# Patient Record
Sex: MALE | Race: WHITE | NOT HISPANIC OR LATINO | Employment: FULL TIME | URBAN - METROPOLITAN AREA
[De-identification: names, ages, dates, MRNs, and addresses within clinical notes are randomized per-mention and may not be internally consistent; named-entity substitution may affect disease eponyms.]

---

## 2018-06-13 ENCOUNTER — HOSPITAL ENCOUNTER (OUTPATIENT)
Facility: CLINIC | Age: 73
End: 2018-06-13
Attending: UROLOGY | Admitting: UROLOGY
Payer: MEDICARE

## 2022-12-06 ENCOUNTER — TELEPHONE (OUTPATIENT)
Dept: OPHTHALMOLOGY | Facility: CLINIC | Age: 77
End: 2022-12-06

## 2022-12-06 NOTE — TELEPHONE ENCOUNTER
Attempted to reach patient' in regards to getting an appointment scheduled with any MD. A voicemail was left with the clinic number for them to call back.

## 2023-01-24 ENCOUNTER — LAB (OUTPATIENT)
Dept: LAB | Facility: CLINIC | Age: 78
End: 2023-01-24
Attending: OPHTHALMOLOGY
Payer: MEDICARE

## 2023-01-24 ENCOUNTER — OFFICE VISIT (OUTPATIENT)
Dept: OPHTHALMOLOGY | Facility: CLINIC | Age: 78
End: 2023-01-24
Attending: OPHTHALMOLOGY
Payer: MEDICARE

## 2023-01-24 DIAGNOSIS — H53.2 DIPLOPIA: ICD-10-CM

## 2023-01-24 DIAGNOSIS — H50.05 ALTERNATING ESOTROPIA: ICD-10-CM

## 2023-01-24 DIAGNOSIS — H53.2 DIPLOPIA: Primary | ICD-10-CM

## 2023-01-24 LAB
T4 FREE SERPL-MCNC: 1.03 NG/DL (ref 0.76–1.46)
TSH SERPL DL<=0.005 MIU/L-ACNC: 3.32 MU/L (ref 0.4–4)

## 2023-01-24 PROCEDURE — 92004 COMPRE OPH EXAM NEW PT 1/>: CPT | Performed by: OPHTHALMOLOGY

## 2023-01-24 PROCEDURE — G0463 HOSPITAL OUTPT CLINIC VISIT: HCPCS | Mod: 25

## 2023-01-24 PROCEDURE — 36415 COLL VENOUS BLD VENIPUNCTURE: CPT

## 2023-01-24 PROCEDURE — 84443 ASSAY THYROID STIM HORMONE: CPT

## 2023-01-24 PROCEDURE — 92060 SENSORIMOTOR EXAMINATION: CPT | Performed by: OPHTHALMOLOGY

## 2023-01-24 PROCEDURE — 83520 IMMUNOASSAY QUANT NOS NONAB: CPT

## 2023-01-24 PROCEDURE — 84445 ASSAY OF TSI GLOBULIN: CPT

## 2023-01-24 PROCEDURE — 84439 ASSAY OF FREE THYROXINE: CPT

## 2023-01-24 PROCEDURE — 92060 SENSORIMOTOR EXAMINATION: CPT | Mod: 26 | Performed by: OPHTHALMOLOGY

## 2023-01-24 ASSESSMENT — VISUAL ACUITY
OS_CC+: -3
OD_CC: 20/20
OD_CC+: -2
OS_CC: 20/25
CORRECTION_TYPE: GLASSES
METHOD: SNELLEN - LINEAR

## 2023-01-24 ASSESSMENT — REFRACTION_WEARINGRX
OD_SPHERE: +1.50
OD_AXIS: 160
OS_ADD: +2.50
SPECS_TYPE: PAL
OD_ADD: +2.50
OS_SPHERE: +1.50
OD_CYLINDER: +2.50
OS_CYLINDER: +2.75
OS_AXIS: 010

## 2023-01-24 ASSESSMENT — CONF VISUAL FIELD
METHOD: COUNTING FINGERS
OS_NORMAL: 1
OS_SUPERIOR_NASAL_RESTRICTION: 0
OD_SUPERIOR_TEMPORAL_RESTRICTION: 0
OS_INFERIOR_TEMPORAL_RESTRICTION: 0
OD_INFERIOR_TEMPORAL_RESTRICTION: 0
OS_INFERIOR_NASAL_RESTRICTION: 0
OD_INFERIOR_NASAL_RESTRICTION: 0
OD_NORMAL: 1
OD_SUPERIOR_NASAL_RESTRICTION: 0
OS_SUPERIOR_TEMPORAL_RESTRICTION: 0

## 2023-01-24 ASSESSMENT — TONOMETRY
IOP_METHOD: ICARE SOLID JC
OS_IOP_MMHG: 24
OD_IOP_MMHG: 21

## 2023-01-24 NOTE — NURSING NOTE
Chief Complaint(s) and History of Present Illness(es)     Diplopia Evaluation            Onset: new    Duration: 2 years    Frequency: constantly    Timing: when looking in the distance    Associated symptoms: Negative for droopy eyelid, eye pain and blurred vision    Treatments tried: glasses and closing one eye    Comments: Horizontal diplopia D, within 4-5ft is single.   1st experience 3/21 slight overlap 30-40sec and resolved. 5x over next 1.5years, same experience.  1st full double 8/22. Seen at [facility], carly 5BO tried for 1 month, worked well.   Got prism Rx gls 3BO BE, worked well for a couple weeks.  Diplopia stable since about 2 wks after noticing it in prism gls. None on computer or reading. Referred to [facility] by [facility]. No h/o MRI per pt.  Dx amaurosis fugax.          Comments    No Hx diplopia in past, no past eye surgery, no Hx strab.    Inf: pt

## 2023-01-25 LAB — TSH RECEP AB SER-ACNC: <1.1 IU/L (ref 0–1.75)

## 2023-01-25 ASSESSMENT — EXTERNAL EXAM - LEFT EYE: OS_EXAM: NORMAL

## 2023-01-25 ASSESSMENT — SLIT LAMP EXAM - LIDS
COMMENTS: MGD, TELANGIECTASIAS
COMMENTS: MGD, TELANGIECTASIAS

## 2023-01-25 ASSESSMENT — CUP TO DISC RATIO
OD_RATIO: 0.8
OS_RATIO: 0.8

## 2023-01-25 ASSESSMENT — EXTERNAL EXAM - RIGHT EYE: OD_EXAM: NORMAL

## 2023-01-26 LAB — TSI SER-ACNC: <1 TSI INDEX

## 2023-01-26 NOTE — PROGRESS NOTES
"Chief Complaints and History of Present Illnesses   Patient presents with     Diplopia Evaluation     Horizontal diplopia D, within 4-5ft is single.   1st experience 3/21 slight overlap 30-40sec and resolved. 5x over next 1.5years, same experience.  1st full double 8/22. Seen at [facility], fresnel 5BO tried for 1 month, worked well.   Got prism Rx gls 3BO BE, worked well for a couple weeks.  Diplopia stable since about 2 wks after noticing it in prism gls. None on computer or reading. Referred to [facility] by [facility]. No h/o MRI per pt.  Dx amaurosis fugax.   Review of systems for the eyes was negative other than the pertinent positives and negatives noted in the HPI.  History is obtained from the patient     Referring provider: Referred Self     [facility] care: [facility]   Assessment   Javid Meraz is a 77 year old male who presents with:       ICD-10-CM    1. 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 Meraz has esotropia and diplopia over last few years.  Will order MRI of brain and orbits. (will get done in [facility]--[place]oser to home)  Check TSH, T4, TRAB, and TSI.  Placed 12 ROSLYN Fresnel.  I discussed eye muscle surgery. Today with Javid, I reviewed the indications, risks, benefits, and alternatives of eye muscle surgery including, but not limited to, failure obtain the desired ocular alignment (\"over\" or \"under\" correction), diplopia, and damage to any structure in or around the eye that may necessitate treatment with medicine, laser, or surgery. I further explained that the goal of surgery is to help control Javid's strabismus. Surgery will not \"cure\" Javid's strabismus or " "resolve/prevent the need for refractive correction. Additional strabismus surgery may be required in the short or long term. I emphasized that regular follow-up to monitor and optimize his vision and alignment would be necessary. We also discussed the risks of surgical injury, bleeding, and infection which may necessitate further medical or surgical treatment and which may result in diplopia, loss of vision, blindness, or loss of the eye(s) in less than 1% of cases and the remote possibility of permanent damage to any organ system or death with the use of general anesthesia.  I explained that we would hide visible scars as much as possible in natural creases but that every patient heals and pigments differently resulting in a variable degree of scarring to the eyes or surrounding facial structures after surgery.  I provided multiple opportunities for questions, answered all questions to the best of my ability, and confirmed that my answers and my discussion were understood.  f/u 4-6 weeks.       Further details of the management plan can be found in the \"Patient Instructions\" section which was printed and given to the patient at checkout.  Return in about 2 months (around 3/24/2023) for an undilated exam with Dr. Fowler.   Attending Physician Attestation:  Complete documentation of historical and exam elements from today's encounter can be found in the full encounter summary report (not reduplicated in this progress note).  I personally obtained the chief complaint(s) and history of present illness.  I confirmed and edited as necessary the review of systems, past medical/surgical history, family history, social history, and examination findings as documented by others; and I examined the patient myself.  I personally reviewed the relevant tests, images, and reports as documented above.  I formulated and edited as necessary the assessment and plan and discussed the findings and management plan with the patient and " family. - Martha Fowler MD 1/25/2023 8:54 PM

## 2023-02-03 ENCOUNTER — HOSPITAL ENCOUNTER (OUTPATIENT)
Dept: MRI IMAGING | Facility: CLINIC | Age: 78
Discharge: HOME | End: 2023-02-03
Attending: OPHTHALMOLOGY | Admitting: OPHTHALMOLOGY
Payer: MEDICARE

## 2023-02-03 PROCEDURE — A9585 GADOBUTROL INJECTION: HCPCS | Performed by: OPHTHALMOLOGY

## 2023-02-03 PROCEDURE — 255N000002 HC RX 255 OP 636: Performed by: OPHTHALMOLOGY

## 2023-02-03 PROCEDURE — 70543 MRI ORBT/FAC/NCK W/O &W/DYE: CPT | Mod: MG

## 2023-02-03 RX ORDER — GADOBUTROL 604.72 MG/ML
7 INJECTION INTRAVENOUS ONCE
Status: COMPLETED | OUTPATIENT
Start: 2023-02-03 | End: 2023-02-03

## 2023-02-03 RX ADMIN — GADOBUTROL 7 ML: 604.72 INJECTION INTRAVENOUS at 10:48

## 2023-02-24 ENCOUNTER — TELEPHONE (OUTPATIENT)
Dept: OPHTHALMOLOGY | Facility: CLINIC | Age: 78
End: 2023-02-24
Payer: MEDICARE

## 2023-02-24 NOTE — TELEPHONE ENCOUNTER
I called patient. I had to leave a voicemail.  I communicated that his MRI Brain/orbits and thyroid labs were unremarkable. I advised him to keep his 03/2023 appt with Dr. Martha Fowler.     Lucius Argueta MD, MSc  Ophthalmology PGY-3 resident physician  Pager: [phone]

## 2023-03-07 ENCOUNTER — TELEPHONE (OUTPATIENT)
Dept: OPHTHALMOLOGY | Facility: CLINIC | Age: 78
End: 2023-03-07
Payer: MEDICARE

## 2023-03-07 ASSESSMENT — REFRACTION_FINALRX
OS_HPRISM: 9
OD_HBASE: OUT
OS_HBASE: OUT
OD_HPRISM: 9

## 2023-03-07 NOTE — TELEPHONE ENCOUNTER
Done.  CATHY Reddy 12:37 PM 3/7/2023        [facility] Call Center    Phone Message    May a detailed message be left on voicemail: yes     Reason for Call: Other: [facility] is calling to see if an updated prescription can be sent to them regarding the updated Fresnel press on prism RX that was prescribed during the appointment on 01/23/2023.     The patient would like to get a new pair of glasses and they would like a copy faxed to them at  [phone].      Please call David with any questions or concerns.      Action Taken: Other: peds eye    Travel Screening: Not Applicable

## 2023-04-11 ENCOUNTER — OFFICE VISIT (OUTPATIENT)
Dept: OPHTHALMOLOGY | Facility: CLINIC | Age: 78
End: 2023-04-11
Attending: OPHTHALMOLOGY
Payer: MEDICARE

## 2023-04-11 DIAGNOSIS — H50.05 ALTERNATING ESOTROPIA: Primary | ICD-10-CM

## 2023-04-11 PROCEDURE — 92012 INTRM OPH EXAM EST PATIENT: CPT | Performed by: OPHTHALMOLOGY

## 2023-04-11 PROCEDURE — G0463 HOSPITAL OUTPT CLINIC VISIT: HCPCS | Mod: 25 | Performed by: OPHTHALMOLOGY

## 2023-04-11 PROCEDURE — 92060 SENSORIMOTOR EXAMINATION: CPT | Performed by: OPHTHALMOLOGY

## 2023-04-11 PROCEDURE — 92060 SENSORIMOTOR EXAMINATION: CPT | Mod: 26 | Performed by: OPHTHALMOLOGY

## 2023-04-11 ASSESSMENT — REFRACTION_WEARINGRX
OS_ADD: +2.50
OS_SPHERE: +1.50
OD_AXIS: 160
OD_ADD: +2.50
OD_SPHERE: +1.50
OS_CYLINDER: +2.75
OS_AXIS: 010
OD_CYLINDER: +2.50
SPECS_TYPE: PAL

## 2023-04-11 ASSESSMENT — CONF VISUAL FIELD
OS_SUPERIOR_TEMPORAL_RESTRICTION: 0
METHOD: COUNTING FINGERS
OS_INFERIOR_TEMPORAL_RESTRICTION: 0
OS_INFERIOR_NASAL_RESTRICTION: 0
OD_INFERIOR_TEMPORAL_RESTRICTION: 0
OD_NORMAL: 1
OS_NORMAL: 1
OS_SUPERIOR_NASAL_RESTRICTION: 0
OD_SUPERIOR_NASAL_RESTRICTION: 0
OD_INFERIOR_NASAL_RESTRICTION: 0
OD_SUPERIOR_TEMPORAL_RESTRICTION: 0

## 2023-04-11 ASSESSMENT — VISUAL ACUITY
OD_CC: 20/25
OS_CC: 20/60
OD_CC+: +2
CORRECTION_TYPE: GLASSES

## 2023-04-11 NOTE — NURSING NOTE
Chief Complaint(s) and History of Present Illness(es)     Diplopia Follow-Up            Laterality: both eyes    Comments: Doing well with Fresnel prism in addition to ground in prism in glasses. Pt reports images are single, has not noticed any image separation updated prism. VA stable. No other concerns. Will be ordering prism glasses from [facility] on Monday.  Thyroid labs normal after LV. Nl brain MRI.  Inf: pt          Comments    Thyroid labs normal.  Feb 2023  IMPRESSION:  1. Normal brain MRI.  2. Normal MRI of the orbits.

## 2023-04-25 ASSESSMENT — EXTERNAL EXAM - LEFT EYE: OS_EXAM: NORMAL

## 2023-04-25 ASSESSMENT — SLIT LAMP EXAM - LIDS
COMMENTS: MGD, TELANGIECTASIAS
COMMENTS: MGD, TELANGIECTASIAS

## 2023-04-25 ASSESSMENT — EXTERNAL EXAM - RIGHT EYE: OD_EXAM: NORMAL

## 2023-04-25 NOTE — PROGRESS NOTES
"Chief Complaints and History of Present Illnesses   Patient presents with     Diplopia Follow-Up     Doing well with Fresnel prism in addition to ground in prism in glasses. Pt reports images are single, has not noticed any image separation updated prism. VA stable. No other concerns. Will be ordering prism glasses from [facility] on Monday.  Thyroid labs normal after LV. Nl brain MRI.  Inf: pt   Review of systems for the eyes was negative other than the pertinent positives and negatives noted in the HPI.  History is obtained from the patient     Referring provider: Referred Self     Primary care: [facility]   Assessment   Javid Meraz is a 77 year old male who presents with:       ICD-10-CM    1. 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 Meraz wants to order prism glasses for big trip in July to [place] and [place] with his granddaughter.  He will f/u after this trip if glasses are not working well and wishes to discuss surgery.       Further details of the management plan can be found in the \"Patient Instructions\" section which was printed and given to the patient at checkout.  No follow-ups on file.   Attending Physician Attestation:  Complete documentation of historical and exam elements from today's encounter can be found in the full encounter summary report (not reduplicated in this progress note).  I personally obtained the chief complaint(s) and history of present illness.  I confirmed and edited as necessary the review of systems, past medical/surgical history, family history, social history, and examination findings as documented by others; and I examined the patient myself.  I personally reviewed the relevant tests, images, and reports as documented above.  I formulated and edited as necessary the assessment and plan and discussed the findings and management plan with the patient and family. - Martha Fowler MD 4/25/2023 2:02 PM        "

## 2025-02-17 ENCOUNTER — APPOINTMENT (OUTPATIENT)
Dept: GENERAL RADIOLOGY | Facility: CLINIC | Age: 80
End: 2025-02-17
Attending: EMERGENCY MEDICINE
Payer: MEDICARE

## 2025-02-17 ENCOUNTER — HOSPITAL ENCOUNTER (EMERGENCY)
Facility: CLINIC | Age: 80
Discharge: HOME | End: 2025-02-17
Attending: EMERGENCY MEDICINE | Admitting: EMERGENCY MEDICINE
Payer: MEDICARE

## 2025-02-17 VITALS
DIASTOLIC BLOOD PRESSURE: 97 MMHG | HEART RATE: 105 BPM | BODY MASS INDEX: 23.7 KG/M2 | RESPIRATION RATE: 16 BRPM | OXYGEN SATURATION: 95 % | HEIGHT: 69 IN | TEMPERATURE: 97.7 F | WEIGHT: 160 LBS | SYSTOLIC BLOOD PRESSURE: 141 MMHG

## 2025-02-17 DIAGNOSIS — K59.00 CONSTIPATION, UNSPECIFIED CONSTIPATION TYPE: ICD-10-CM

## 2025-02-17 LAB
ALBUMIN SERPL BCG-MCNC: 4.2 G/DL (ref 3.5–5.2)
ALP SERPL-CCNC: 137 U/L (ref 40–150)
ALT SERPL W P-5'-P-CCNC: 46 U/L (ref 0–70)
ANION GAP SERPL CALCULATED.3IONS-SCNC: 14 MMOL/L (ref 7–15)
AST SERPL W P-5'-P-CCNC: 35 U/L (ref 0–45)
BASOPHILS # BLD AUTO: 0.1 10E3/UL (ref 0–0.2)
BASOPHILS NFR BLD AUTO: 1 %
BILIRUB SERPL-MCNC: 2 MG/DL
BUN SERPL-MCNC: 18.1 MG/DL (ref 8–23)
CALCIUM SERPL-MCNC: 9.6 MG/DL (ref 8.8–10.4)
CHLORIDE SERPL-SCNC: 99 MMOL/L (ref 98–107)
CREAT SERPL-MCNC: 0.88 MG/DL (ref 0.67–1.17)
EGFRCR SERPLBLD CKD-EPI 2021: 86 ML/MIN/1.73M2
EOSINOPHIL # BLD AUTO: 0.2 10E3/UL (ref 0–0.7)
EOSINOPHIL NFR BLD AUTO: 2 %
ERYTHROCYTE [DISTWIDTH] IN BLOOD BY AUTOMATED COUNT: 12.5 % (ref 10–15)
GLUCOSE SERPL-MCNC: 146 MG/DL (ref 70–99)
HCO3 SERPL-SCNC: 24 MMOL/L (ref 22–29)
HCT VFR BLD AUTO: 46.7 % (ref 40–53)
HGB BLD-MCNC: 16 G/DL (ref 13.3–17.7)
HOLD SPECIMEN: NORMAL
IMM GRANULOCYTES # BLD: 0 10E3/UL
IMM GRANULOCYTES NFR BLD: 0 %
LIPASE SERPL-CCNC: 46 U/L (ref 13–60)
LYMPHOCYTES # BLD AUTO: 2.2 10E3/UL (ref 0.8–5.3)
LYMPHOCYTES NFR BLD AUTO: 18 %
MCH RBC QN AUTO: 30.5 PG (ref 26.5–33)
MCHC RBC AUTO-ENTMCNC: 34.3 G/DL (ref 31.5–36.5)
MCV RBC AUTO: 89 FL (ref 78–100)
MONOCYTES # BLD AUTO: 0.9 10E3/UL (ref 0–1.3)
MONOCYTES NFR BLD AUTO: 7 %
NEUTROPHILS # BLD AUTO: 9 10E3/UL (ref 1.6–8.3)
NEUTROPHILS NFR BLD AUTO: 73 %
NRBC # BLD AUTO: 0 10E3/UL
NRBC BLD AUTO-RTO: 0 /100
PLATELET # BLD AUTO: 228 10E3/UL (ref 150–450)
POTASSIUM SERPL-SCNC: 3.8 MMOL/L (ref 3.4–5.3)
PROT SERPL-MCNC: 7.1 G/DL (ref 6.4–8.3)
RBC # BLD AUTO: 5.25 10E6/UL (ref 4.4–5.9)
SODIUM SERPL-SCNC: 137 MMOL/L (ref 135–145)
WBC # BLD AUTO: 12.4 10E3/UL (ref 4–11)

## 2025-02-17 PROCEDURE — 99284 EMERGENCY DEPT VISIT MOD MDM: CPT | Performed by: EMERGENCY MEDICINE

## 2025-02-17 PROCEDURE — 85025 COMPLETE CBC W/AUTO DIFF WBC: CPT | Performed by: SOCIAL WORKER

## 2025-02-17 PROCEDURE — 74019 RADEX ABDOMEN 2 VIEWS: CPT

## 2025-02-17 PROCEDURE — 82565 ASSAY OF CREATININE: CPT | Performed by: SOCIAL WORKER

## 2025-02-17 PROCEDURE — 250N000013 HC RX MED GY IP 250 OP 250 PS 637: Performed by: EMERGENCY MEDICINE

## 2025-02-17 PROCEDURE — 83690 ASSAY OF LIPASE: CPT | Performed by: SOCIAL WORKER

## 2025-02-17 PROCEDURE — 36415 COLL VENOUS BLD VENIPUNCTURE: CPT | Performed by: EMERGENCY MEDICINE

## 2025-02-17 RX ADMIN — SIMETHICONE 226 ML: 125 CAPSULE, LIQUID FILLED ORAL at 08:52

## 2025-02-17 ASSESSMENT — ACTIVITIES OF DAILY LIVING (ADL)
ADLS_ACUITY_SCORE: 41

## 2025-02-17 ASSESSMENT — COLUMBIA-SUICIDE SEVERITY RATING SCALE - C-SSRS
2. HAVE YOU ACTUALLY HAD ANY THOUGHTS OF KILLING YOURSELF IN THE PAST MONTH?: NO
1. IN THE PAST MONTH, HAVE YOU WISHED YOU WERE DEAD OR WISHED YOU COULD GO TO SLEEP AND NOT WAKE UP?: NO
6. HAVE YOU EVER DONE ANYTHING, STARTED TO DO ANYTHING, OR PREPARED TO DO ANYTHING TO END YOUR LIFE?: NO

## 2025-02-17 NOTE — ED PROVIDER NOTES
"  Emergency Department Note      History of Present Illness     Chief Complaint   Constipation    HPI   Javid Meraz is a 79 year old male with a history of hypertension, hyperlipidemia, CAD, prostate cancer, and recurrent UTI who presents with concern for constipation. He reports he has not had a bowel movement in 4-5 days he notes. He adds he rarely ever has this problem. He adds the last time he was seen in the ED for constipation was 15 years ago. He tried taking Miralax as well as two other over the counter medications for constipation with no improvement. He does not take this daily. He denies blood in stool, fevers, or vomiting.     Independent Historian   None    Review of External Notes   ***    Past Medical History     Medical History and Problem List   Allergic rhinitis   Chronic diarrhea   CAD in native artery   Former smoker   Gilbert syndrome   Hypercholesterolemia  Hypertension  Hyperlipidemia   Hemorrhoids      Prediabetes   History of prostate cancer   History of colonic polyps   Mitral valve disorders   Mitral valve prolapse   Recurrent UTI  Vitamin D deficiency     Medications   Atorvastatin  Diltiazem CD    Surgical History   Rad resection tonsil/pillars  Removal colon polyps     Physical Exam     Patient Vitals for the past 24 hrs:   BP Temp Temp src Pulse Resp SpO2 Height Weight   02/17/25 0754 -- -- -- -- -- 93 % -- --   02/17/25 0738 (!) 136/97 -- -- 89 16 97 % -- --   02/17/25 0301 (!) 159/80 97.7  F (36.5  C) Oral 89 16 95 % 1.753 m (5' 9\") 72.6 kg (160 lb)     Physical Exam  ***    Diagnostics     Lab Results   Labs Ordered and Resulted from Time of ED Arrival to Time of ED Departure   COMPREHENSIVE METABOLIC PANEL - Abnormal       Result Value    Sodium 137      Potassium 3.8      Carbon Dioxide (CO2) 24      Anion Gap 14      Urea Nitrogen 18.1      Creatinine 0.88      GFR Estimate 86      Calcium 9.6      Chloride 99      Glucose 146 (*)     Alkaline Phosphatase 137      AST 35      " "ALT 46      Protein Total 7.1      Albumin 4.2      Bilirubin Total 2.0 (*)    CBC WITH PLATELETS AND DIFFERENTIAL - Abnormal    WBC Count 12.4 (*)     RBC Count 5.25      Hemoglobin 16.0      Hematocrit 46.7      MCV 89      MCH 30.5      MCHC 34.3      RDW 12.5      Platelet Count 228      % Neutrophils 73      % Lymphocytes 18      % Monocytes 7      % Eosinophils 2      % Basophils 1      % Immature Granulocytes 0      NRBCs per 100 WBC 0      Absolute Neutrophils 9.0 (*)     Absolute Lymphocytes 2.2      Absolute Monocytes 0.9      Absolute Eosinophils 0.2      Absolute Basophils 0.1      Absolute Immature Granulocytes 0.0      Absolute NRBCs 0.0     LIPASE - Normal    Lipase 46       Imaging   XR Abdomen 2 Views   Final Result   IMPRESSION: Supine and upright KUB. Marked residual stool and moderate fecal impaction. Bowel gas pattern nonspecific. Nothing for obstruction or free air. No evidence for renal stones.        Independent Interpretation   I independently reviewed the patient's abdominal XR. I see no*** evidence of ***.     ED Course      Medications Administered   Medications   Enema Compound (docusate/mineral oil/NaPhos) NO MAG CIT PREMIX (226 mLs Rectal $Given 2/17/25 0852)     Discussion of Management   None    ED Course   ED Course as of 02/17/25 1024   Mon Feb 17, 2025   0731 I obtained the patient's history and examined as noted above.    1015 Patient's nurse informed me that the patient was able to have a bowel movement.    1022 I rechecked the patient and explained findings. He reports that was the \"biggest bowel movement of his life.\"     Additional Documentation  None    Medical Decision Making / Diagnosis     CMS Diagnoses: None    MIPS       None    [facility]   Javid Meraz is a 79 year old male ***    He is in stable condition at the time of discharge, red flags that should merit ED return were discussed as well as recommended follow-up instructions. All questions were answered and he is in " agreement with the plan.     Disposition   The patient was discharged.     Diagnosis     ICD-10-CM    1. Constipation, unspecified constipation type  K59.00          Scribe Disclosure:  I, Enma Somers, am serving as a scribe at 7:19 AM on 2/17/2025 to document services personally performed by Cyril Valle MD based on my observations and the provider's statements to me.

## 2025-02-17 NOTE — ED NOTES
Patient on the call light noting that he was able to go, RN will be notified when out of another patients room.

## 2025-02-17 NOTE — ED TRIAGE NOTES
Pt reports constipation with rectal pain. No BM for about 5 days, has tried OTC med without relief.      Triage Assessment (Adult)       Row Name 02/17/25 0300          Triage Assessment    Airway WDL WDL        Respiratory WDL    Respiratory WDL WDL        Peripheral/Neurovascular WDL    Peripheral Neurovascular WDL WDL        Cognitive/Neuro/Behavioral WDL    Cognitive/Neuro/Behavioral WDL WDL

## 2025-06-24 ENCOUNTER — OFFICE VISIT (OUTPATIENT)
Dept: OPHTHALMOLOGY | Facility: CLINIC | Age: 80
End: 2025-06-24
Attending: OPHTHALMOLOGY
Payer: MEDICARE

## 2025-06-24 DIAGNOSIS — H53.2 DIPLOPIA: ICD-10-CM

## 2025-06-24 DIAGNOSIS — H50.00 MONOCULAR ESOTROPIA: Primary | ICD-10-CM

## 2025-06-24 PROCEDURE — 99214 OFFICE O/P EST MOD 30 MIN: CPT | Performed by: OPHTHALMOLOGY

## 2025-06-24 PROCEDURE — G0463 HOSPITAL OUTPT CLINIC VISIT: HCPCS | Performed by: OPHTHALMOLOGY

## 2025-06-24 PROCEDURE — 92060 SENSORIMOTOR EXAMINATION: CPT | Performed by: OPHTHALMOLOGY

## 2025-06-24 ASSESSMENT — CONF VISUAL FIELD
OS_SUPERIOR_NASAL_RESTRICTION: 0
OD_INFERIOR_TEMPORAL_RESTRICTION: 0
OD_SUPERIOR_NASAL_RESTRICTION: 0
OS_INFERIOR_TEMPORAL_RESTRICTION: 0
OS_NORMAL: 1
OD_INFERIOR_NASAL_RESTRICTION: 0
OS_SUPERIOR_TEMPORAL_RESTRICTION: 0
OD_NORMAL: 1
OD_SUPERIOR_TEMPORAL_RESTRICTION: 0
OS_INFERIOR_NASAL_RESTRICTION: 0

## 2025-06-24 ASSESSMENT — REFRACTION_WEARINGRX
OS_CYLINDER: +2.50
OD_AXIS: 165
OD_ADD: +2.75
OD_CYLINDER: +2.75
OD_SPHERE: +1.25
OS_ADD: +2.75
OS_SPHERE: +1.00
OS_AXIS: 010

## 2025-06-24 ASSESSMENT — TONOMETRY
IOP_METHOD: ICARE
OS_IOP_MMHG: 21
OD_IOP_MMHG: 22

## 2025-06-24 ASSESSMENT — VISUAL ACUITY
OS_PH_CC+: -2
CORRECTION_TYPE: GLASSES
OD_CC+: -2
METHOD: SNELLEN - LINEAR
OD_CC: 20/25
OS_PH_CC: 20/25
OS_CC+: +2
OS_CC: 20/40

## 2025-07-02 ASSESSMENT — SLIT LAMP EXAM - LIDS
COMMENTS: MGD, TELANGIECTASIAS
COMMENTS: MGD, TELANGIECTASIAS

## 2025-07-02 ASSESSMENT — EXTERNAL EXAM - RIGHT EYE: OD_EXAM: NORMAL

## 2025-07-02 ASSESSMENT — EXTERNAL EXAM - LEFT EYE: OS_EXAM: NORMAL

## 2025-07-02 NOTE — PROGRESS NOTES
"Chief Complaints and History of Present Illnesses   Patient presents with    Diplopia Follow Up     Patient reports constant horizontal diplopia. Worse at distance. Diplopia has been worsening since LV, but stable for about a year. Minimal diplopia at near. Patient tends to close LE eye, especially when driving. Interested in surgery.  Newer fresnel now is not longer helping diplopia.    Review of systems for the eyes was negative other than the pertinent positives and negatives noted in the HPI.  History is obtained from the patient     Referring provider: Referred Self     Primary care: [facility]   Assessment   Javid Meraz is a 80 year old male who presents with:       ICD-10-CM    1. 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 Diplopia  H53.2             Plan  Mr. Meraz has large esotropia and diplopia.  I recommend eye muscle surgery. Today with Javid, I reviewed the indications, risks, benefits, and alternatives of eye muscle surgery including, but not limited to, failure obtain the desired ocular alignment (\"over\" or \"under\" correction), diplopia, and damage to any structure in or around the eye that may necessitate treatment with medicine, laser, or surgery. I further explained that the goal of surgery is to help control Javid's strabismus. Surgery will not \"cure\" Javid's strabismus or resolve/prevent the need for refractive correction. Additional strabismus surgery may be required in the short or long term. I emphasized that regular follow-up to monitor and optimize his vision and alignment would be necessary. We also discussed the risks of surgical injury, bleeding, and infection which may necessitate further medical or surgical treatment and which may result in diplopia, loss of vision, blindness, or loss of the eye(s) in less than 1% of cases and the remote possibility of permanent damage to any organ system or death with the use of general anesthesia.  I explained that we would hide " "visible scars as much as possible in natural creases but that every patient heals and pigments differently resulting in a variable degree of scarring to the eyes or surrounding facial structures after surgery.  I provided multiple opportunities for questions, answered all questions to the best of my ability, and confirmed that my answers and my discussion were understood.        Further details of the management plan can be found in the \"Patient Instructions\" section which was printed and given to the patient at checkout.  No follow-ups on file.   Attending Physician Attestation:  Complete documentation of historical and exam elements from today's encounter can be found in the full encounter summary report (not reduplicated in this progress note).  I personally obtained the chief complaint(s) and history of present illness.  I confirmed and edited as necessary the review of systems, past medical/surgical history, family history, social history, and examination findings as documented by others; and I examined the patient myself.  I personally reviewed the relevant tests, images, and reports as documented above.  I formulated and edited as necessary the assessment and plan and discussed the findings and management plan with the patient and family. - Martha Fowler MD 7/2/2025 1:17 PM          "

## 2025-07-22 ENCOUNTER — OFFICE VISIT (OUTPATIENT)
Dept: OPHTHALMOLOGY | Facility: CLINIC | Age: 80
End: 2025-07-22
Attending: OPHTHALMOLOGY
Payer: MEDICARE

## 2025-07-22 DIAGNOSIS — H53.2 DIPLOPIA: ICD-10-CM

## 2025-07-22 DIAGNOSIS — H50.00 MONOCULAR ESOTROPIA: Primary | ICD-10-CM

## 2025-07-22 PROCEDURE — 92060 SENSORIMOTOR EXAMINATION: CPT

## 2025-07-22 PROCEDURE — 92285 EXTERNAL OCULAR PHOTOGRAPHY: CPT

## 2025-07-22 ASSESSMENT — REFRACTION_WEARINGRX
OS_ADD: +2.75
OS_SPHERE: +1.00
OD_SPHERE: +1.25
OD_CYLINDER: +2.75
OS_AXIS: 010
OS_CYLINDER: +2.50
OD_ADD: +2.75
OD_AXIS: 165

## 2025-07-22 ASSESSMENT — VISUAL ACUITY
OS_CC: 20/40
OD_CC: 20/25
OS_CC+: -2
OD_CC+: -3
METHOD: SNELLEN - LINEAR

## 2025-07-22 NOTE — NURSING NOTE
Chief Complaint(s) and History of Present Illness(es)       Diplopia Follow Up              Laterality: left eye    Associated symptoms: Negative for dizziness, muscle weakness and numbness    Comments: No changes to alignment noticed, constant diplopia, closes an eye to help with driving

## 2025-07-22 NOTE — PROGRESS NOTES
Chief Complaint(s) & History of Present Illness  Chief Complaint(s) and History of Present Illness(es)       Diplopia Follow Up              Laterality: left eye    Associated symptoms: Negative for dizziness, muscle weakness and numbness    Comments: No changes to alignment noticed, constant diplopia, closes an eye to help with driving                         Assessment and Plan:      Javid Meraz is a 80 year old male who presents with:     Monocular esotropia  stable  - Sensorimotor  - External Photos 9 [employer] Gazes    Diplopia  - Sensorimotor       PLAN:  Continue to surgery as planned

## 2025-07-26 ENCOUNTER — HEALTH MAINTENANCE LETTER (OUTPATIENT)
Age: 80
End: 2025-07-26

## 2025-07-29 ENCOUNTER — ANESTHESIA EVENT (OUTPATIENT)
Dept: SURGERY | Facility: CLINIC | Age: 80
End: 2025-07-29
Payer: COMMERCIAL

## 2025-07-29 ASSESSMENT — LIFESTYLE VARIABLES: TOBACCO_USE: 1

## 2025-07-29 NOTE — ANESTHESIA PREPROCEDURE EVALUATION
"Anesthesia Pre-Procedure Evaluation    Patient: Javid Meraz   MRN: [id number] : 1945          Procedure : Procedure(s):  RECESSION OR RESECTION, MUSCLE, EXTRAOCULAR, FOR STRABISMUS CORRECTION         Past Medical History:   Diagnosis Date    Hypercholesteraemia     Hypertension       Past Surgical History:   Procedure Laterality Date    AS RAD RESEC TONSIL/PILLARS        Allergies   Allergen Reactions    Fish Oil Hives and Nausea     Halibut, Sole, and Swordfish  (other fish ok)      Social History     Tobacco Use    Smoking status: Former     Current packs/day: 0.00     Types: Cigarettes     Quit date:      Years since quittin.6    Smokeless tobacco: Not on file   Substance Use Topics    Alcohol use: Yes     Comment: social      Wt Readings from Last 1 Encounters:   25 72.6 kg (160 lb)        Anesthesia Evaluation            ROS/MED HX  ENT/Pulmonary:     (+)           allergic rhinitis,     tobacco use, Past use,                       Neurologic: Comment: horizontal diplopia       Cardiovascular: Comment: Per H&P Note, \"He experienced an increase in heart palpitations during the spring, but these have since subsided. He continues to experience symptoms, but they are not as severe as they were in the spring. He has been dealing with these symptoms for 50 years. He is able to walk at least four city blocks and climb two flights of stairs. He is currently taking diltiazem for his heart and blood pressure.\" And \"Holter monitor showing occasional supraventricular beats\"          (+) Dyslipidemia hypertension- -  CAD -  - -                          Irregular Heartbeat/Palpitations,            METS/Exercise Tolerance:     Hematologic:       Musculoskeletal:       GI/Hepatic:       Renal/Genitourinary:       Endo:     (+)  type II DM,   Not using insulin,                 Psychiatric/Substance Use:       Infectious Disease:       Malignancy:   (+) Malignancy, History of Prostate.    Other:      " "        Physical Exam  Airway  Cardiovascular  Comments: Per H&P Note, \"He experienced an increase in heart palpitations during the spring, but these have since subsided. He continues to experience symptoms, but they are not as severe as they were in the spring. He has been dealing with these symptoms for 50 years. He is able to walk at least four city blocks and climb two flights of stairs. He is currently taking diltiazem for his heart and blood pressure.\" And \"Holter monitor showing occasional supraventricular beats\"        Dental     Pulmonary       Neurological   Other Findings       OUTSIDE LABS:  CBC:   Lab Results   Component Value Date    WBC 12.4 (H) 02/17/2025    WBC 13.1 (H) 10/01/2008    HGB 16.0 02/17/2025    HGB 16.4 10/01/2008    HCT 46.7 02/17/2025    HCT 49.0 10/01/2008     02/17/2025     10/01/2008     BMP:   Lab Results   Component Value Date     02/17/2025     10/01/2008    POTASSIUM 3.8 02/17/2025    POTASSIUM 4.4 10/01/2008    CHLORIDE 99 02/17/2025    CHLORIDE 99 10/01/2008    CO2 24 02/17/2025    CO2 29 10/01/2008    BUN 18.1 02/17/2025    BUN 17 10/01/2008    CR 0.88 02/17/2025    CR 0.93 10/01/2008     (H) 02/17/2025     (H) 10/01/2008     COAGS: No results found for: \"PTT\", \"INR\", \"FIBR\"  POC: No results found for: \"BGM\", \"HCG\", \"HCGS\"  HEPATIC:   Lab Results   Component Value Date    ALBUMIN 4.2 02/17/2025    PROTTOTAL 7.1 02/17/2025    ALT 46 02/17/2025    AST 35 02/17/2025    ALKPHOS 137 02/17/2025    BILITOTAL 2.0 (H) 02/17/2025     OTHER:   Lab Results   Component Value Date    CAM 9.6 02/17/2025    LIPASE 46 02/17/2025    TSH 3.32 01/24/2023    T4 1.03 01/24/2023       Anesthesia Plan    ASA Status:  2       Anesthesia Type: General.  Airway: supraglottic airway.  Induction: intravenous.  Maintenance: Balanced.   Techniques and Equipment:     - Airway:  Planned airway equipment includes supraglottic airway.     - Monitoring Plan: standard ASA " monitoring, processed EEG monitor     Consents            Postoperative Care         Comments:                   Carmen Maddox,     I have reviewed the pertinent notes and labs in the chart from the past 30 days and (re)examined the patient.  Any updates or changes from those notes are reflected in this note.    Clinically Significant Risk Factors Present on Admission

## 2025-07-30 ENCOUNTER — HOSPITAL ENCOUNTER (OUTPATIENT)
Facility: CLINIC | Age: 80
Discharge: HOME | End: 2025-07-30
Attending: OPHTHALMOLOGY | Admitting: OPHTHALMOLOGY
Payer: COMMERCIAL

## 2025-07-30 ENCOUNTER — ANESTHESIA (OUTPATIENT)
Dept: SURGERY | Facility: CLINIC | Age: 80
End: 2025-07-30
Payer: COMMERCIAL

## 2025-07-30 VITALS
SYSTOLIC BLOOD PRESSURE: 137 MMHG | HEIGHT: 69 IN | TEMPERATURE: 98.1 F | BODY MASS INDEX: 24.95 KG/M2 | RESPIRATION RATE: 16 BRPM | OXYGEN SATURATION: 94 % | HEART RATE: 77 BPM | DIASTOLIC BLOOD PRESSURE: 79 MMHG | WEIGHT: 168.43 LBS

## 2025-07-30 DIAGNOSIS — Z98.890 POSTOPERATIVE EYE STATE: Primary | ICD-10-CM

## 2025-07-30 PROCEDURE — 250N000013 HC RX MED GY IP 250 OP 250 PS 637: Performed by: ANESTHESIOLOGY

## 2025-07-30 PROCEDURE — 710N000012 HC RECOVERY PHASE 2, PER MINUTE: Performed by: OPHTHALMOLOGY

## 2025-07-30 PROCEDURE — 370N000017 HC ANESTHESIA TECHNICAL FEE, PER MIN: Performed by: OPHTHALMOLOGY

## 2025-07-30 PROCEDURE — 250N000025 HC SEVOFLURANE, PER MIN: Performed by: OPHTHALMOLOGY

## 2025-07-30 PROCEDURE — 999N000141 HC STATISTIC PRE-PROCEDURE NURSING ASSESSMENT: Performed by: OPHTHALMOLOGY

## 2025-07-30 PROCEDURE — 272N000001 HC OR GENERAL SUPPLY STERILE: Performed by: OPHTHALMOLOGY

## 2025-07-30 PROCEDURE — 250N000009 HC RX 250: Performed by: NURSE ANESTHETIST, CERTIFIED REGISTERED

## 2025-07-30 PROCEDURE — 250N000009 HC RX 250: Performed by: OPHTHALMOLOGY

## 2025-07-30 PROCEDURE — 360N000076 HC SURGERY LEVEL 3, PER MIN: Performed by: OPHTHALMOLOGY

## 2025-07-30 PROCEDURE — 258N000003 HC RX IP 258 OP 636: Performed by: NURSE ANESTHETIST, CERTIFIED REGISTERED

## 2025-07-30 PROCEDURE — 710N000010 HC RECOVERY PHASE 1, LEVEL 2, PER MIN: Performed by: OPHTHALMOLOGY

## 2025-07-30 PROCEDURE — 250N000011 HC RX IP 250 OP 636: Performed by: NURSE ANESTHETIST, CERTIFIED REGISTERED

## 2025-07-30 RX ORDER — OXYMETAZOLINE HYDROCHLORIDE 0.05 G/100ML
SPRAY NASAL PRN
Status: DISCONTINUED | OUTPATIENT
Start: 2025-07-30 | End: 2025-07-30 | Stop reason: HOSPADM

## 2025-07-30 RX ORDER — DEXAMETHASONE SODIUM PHOSPHATE 4 MG/ML
4 INJECTION, SOLUTION INTRA-ARTICULAR; INTRALESIONAL; INTRAMUSCULAR; INTRAVENOUS; SOFT TISSUE
Status: DISCONTINUED | OUTPATIENT
Start: 2025-07-30 | End: 2025-07-30 | Stop reason: HOSPADM

## 2025-07-30 RX ORDER — FENTANYL CITRATE 50 UG/ML
50 INJECTION, SOLUTION INTRAMUSCULAR; INTRAVENOUS EVERY 5 MIN PRN
Status: DISCONTINUED | OUTPATIENT
Start: 2025-07-30 | End: 2025-07-30 | Stop reason: HOSPADM

## 2025-07-30 RX ORDER — LIDOCAINE HYDROCHLORIDE 20 MG/ML
INJECTION, SOLUTION INFILTRATION; PERINEURAL PRN
Status: DISCONTINUED | OUTPATIENT
Start: 2025-07-30 | End: 2025-07-30

## 2025-07-30 RX ORDER — ONDANSETRON 2 MG/ML
INJECTION INTRAMUSCULAR; INTRAVENOUS PRN
Status: DISCONTINUED | OUTPATIENT
Start: 2025-07-30 | End: 2025-07-30

## 2025-07-30 RX ORDER — OXYCODONE HYDROCHLORIDE 5 MG/1
5 TABLET ORAL
Status: DISCONTINUED | OUTPATIENT
Start: 2025-07-30 | End: 2025-07-30 | Stop reason: HOSPADM

## 2025-07-30 RX ORDER — FENTANYL CITRATE 50 UG/ML
25 INJECTION, SOLUTION INTRAMUSCULAR; INTRAVENOUS EVERY 5 MIN PRN
Status: DISCONTINUED | OUTPATIENT
Start: 2025-07-30 | End: 2025-07-30 | Stop reason: HOSPADM

## 2025-07-30 RX ORDER — ONDANSETRON 2 MG/ML
4 INJECTION INTRAMUSCULAR; INTRAVENOUS EVERY 30 MIN PRN
Status: DISCONTINUED | OUTPATIENT
Start: 2025-07-30 | End: 2025-07-30 | Stop reason: HOSPADM

## 2025-07-30 RX ORDER — SODIUM CHLORIDE, SODIUM LACTATE, POTASSIUM CHLORIDE, CALCIUM CHLORIDE 600; 310; 30; 20 MG/100ML; MG/100ML; MG/100ML; MG/100ML
INJECTION, SOLUTION INTRAVENOUS CONTINUOUS
Status: DISCONTINUED | OUTPATIENT
Start: 2025-07-30 | End: 2025-07-30 | Stop reason: HOSPADM

## 2025-07-30 RX ORDER — ONDANSETRON 4 MG/1
4 TABLET, ORALLY DISINTEGRATING ORAL EVERY 30 MIN PRN
Status: DISCONTINUED | OUTPATIENT
Start: 2025-07-30 | End: 2025-07-30 | Stop reason: HOSPADM

## 2025-07-30 RX ORDER — OXYCODONE HYDROCHLORIDE 10 MG/1
10 TABLET ORAL
Status: DISCONTINUED | OUTPATIENT
Start: 2025-07-30 | End: 2025-07-30 | Stop reason: HOSPADM

## 2025-07-30 RX ORDER — EPHEDRINE SULFATE 50 MG/ML
INJECTION, SOLUTION INTRAMUSCULAR; INTRAVENOUS; SUBCUTANEOUS PRN
Status: DISCONTINUED | OUTPATIENT
Start: 2025-07-30 | End: 2025-07-30

## 2025-07-30 RX ORDER — BALANCED SALT SOLUTION 6.4; .75; .48; .3; 3.9; 1.7 MG/ML; MG/ML; MG/ML; MG/ML; MG/ML; MG/ML
SOLUTION OPHTHALMIC PRN
Status: DISCONTINUED | OUTPATIENT
Start: 2025-07-30 | End: 2025-07-30 | Stop reason: HOSPADM

## 2025-07-30 RX ORDER — SODIUM CHLORIDE, SODIUM LACTATE, POTASSIUM CHLORIDE, CALCIUM CHLORIDE 600; 310; 30; 20 MG/100ML; MG/100ML; MG/100ML; MG/100ML
INJECTION, SOLUTION INTRAVENOUS CONTINUOUS PRN
Status: DISCONTINUED | OUTPATIENT
Start: 2025-07-30 | End: 2025-07-30

## 2025-07-30 RX ORDER — FENTANYL CITRATE 50 UG/ML
INJECTION, SOLUTION INTRAMUSCULAR; INTRAVENOUS PRN
Status: DISCONTINUED | OUTPATIENT
Start: 2025-07-30 | End: 2025-07-30

## 2025-07-30 RX ORDER — HYDROMORPHONE HYDROCHLORIDE 1 MG/ML
0.2 INJECTION, SOLUTION INTRAMUSCULAR; INTRAVENOUS; SUBCUTANEOUS EVERY 5 MIN PRN
Status: DISCONTINUED | OUTPATIENT
Start: 2025-07-30 | End: 2025-07-30 | Stop reason: HOSPADM

## 2025-07-30 RX ORDER — LIDOCAINE 40 MG/G
CREAM TOPICAL
Status: DISCONTINUED | OUTPATIENT
Start: 2025-07-30 | End: 2025-07-30 | Stop reason: HOSPADM

## 2025-07-30 RX ORDER — HYDROMORPHONE HYDROCHLORIDE 1 MG/ML
0.4 INJECTION, SOLUTION INTRAMUSCULAR; INTRAVENOUS; SUBCUTANEOUS EVERY 5 MIN PRN
Status: DISCONTINUED | OUTPATIENT
Start: 2025-07-30 | End: 2025-07-30 | Stop reason: HOSPADM

## 2025-07-30 RX ORDER — DEXAMETHASONE SODIUM PHOSPHATE 4 MG/ML
INJECTION, SOLUTION INTRA-ARTICULAR; INTRALESIONAL; INTRAMUSCULAR; INTRAVENOUS; SOFT TISSUE PRN
Status: DISCONTINUED | OUTPATIENT
Start: 2025-07-30 | End: 2025-07-30

## 2025-07-30 RX ORDER — ACETAMINOPHEN 325 MG/1
975 TABLET ORAL ONCE
Status: COMPLETED | OUTPATIENT
Start: 2025-07-30 | End: 2025-07-30

## 2025-07-30 RX ORDER — NALOXONE HYDROCHLORIDE 0.4 MG/ML
0.1 INJECTION, SOLUTION INTRAMUSCULAR; INTRAVENOUS; SUBCUTANEOUS
Status: DISCONTINUED | OUTPATIENT
Start: 2025-07-30 | End: 2025-07-30 | Stop reason: HOSPADM

## 2025-07-30 RX ORDER — OXYCODONE HYDROCHLORIDE 5 MG/1
5 TABLET ORAL EVERY 6 HOURS PRN
Qty: 6 TABLET | Refills: 0 | Status: SHIPPED | OUTPATIENT
Start: 2025-07-30 | End: 2025-08-02

## 2025-07-30 RX ORDER — PROPOFOL 10 MG/ML
INJECTION, EMULSION INTRAVENOUS PRN
Status: DISCONTINUED | OUTPATIENT
Start: 2025-07-30 | End: 2025-07-30

## 2025-07-30 RX ORDER — NEOMYCIN SULFATE, POLYMYXIN B SULFATE, AND DEXAMETHASONE 3.5; 10000; 1 MG/G; [USP'U]/G; MG/G
0.5 OINTMENT OPHTHALMIC AT BEDTIME
Qty: 3.5 G | Refills: 2 | Status: SHIPPED | OUTPATIENT
Start: 2025-07-30 | End: 2025-08-04

## 2025-07-30 RX ADMIN — SODIUM CHLORIDE, SODIUM LACTATE, POTASSIUM CHLORIDE, AND CALCIUM CHLORIDE: .6; .31; .03; .02 INJECTION, SOLUTION INTRAVENOUS at 15:34

## 2025-07-30 RX ADMIN — FENTANYL CITRATE 50 MCG: 50 INJECTION INTRAMUSCULAR; INTRAVENOUS at 15:52

## 2025-07-30 RX ADMIN — LIDOCAINE HYDROCHLORIDE 80 MG: 20 INJECTION, SOLUTION INFILTRATION; PERINEURAL at 15:39

## 2025-07-30 RX ADMIN — ACETAMINOPHEN 975 MG: 325 TABLET ORAL at 18:07

## 2025-07-30 RX ADMIN — FENTANYL CITRATE 50 MCG: 50 INJECTION INTRAMUSCULAR; INTRAVENOUS at 15:39

## 2025-07-30 RX ADMIN — DEXAMETHASONE SODIUM PHOSPHATE 8 MG: 4 INJECTION, SOLUTION INTRAMUSCULAR; INTRAVENOUS at 15:50

## 2025-07-30 RX ADMIN — EPHEDRINE SULFATE 10 MG: 5 INJECTION INTRAVENOUS at 16:09

## 2025-07-30 RX ADMIN — ONDANSETRON 4 MG: 2 INJECTION INTRAMUSCULAR; INTRAVENOUS at 16:25

## 2025-07-30 RX ADMIN — PROPOFOL 200 MG: 10 INJECTION, EMULSION INTRAVENOUS at 15:39

## 2025-07-30 RX ADMIN — PHENYLEPHRINE HYDROCHLORIDE 100 MCG: 10 INJECTION INTRAVENOUS at 16:07

## 2025-07-30 RX ADMIN — PHENYLEPHRINE HYDROCHLORIDE 100 MCG: 10 INJECTION INTRAVENOUS at 16:09

## 2025-07-30 RX ADMIN — PHENYLEPHRINE HYDROCHLORIDE 100 MCG: 10 INJECTION INTRAVENOUS at 16:06

## 2025-07-30 ASSESSMENT — ACTIVITIES OF DAILY LIVING (ADL)
ADLS_ACUITY_SCORE: 35
ADLS_ACUITY_SCORE: 36
ADLS_ACUITY_SCORE: 35
ADLS_ACUITY_SCORE: 35

## 2025-07-30 NOTE — DISCHARGE INSTRUCTIONS
Ibuprofen and Tylenol as directed.  Oxycodone for break-through pain.  Cool compresses OK as tolerated.  Maxitrol ointment at bedtime x 5-7 days.  Take Fresnel prism off glasses.  No swimming x 10 days. Showers are OK.  Follow up on 8/7/2025 as scheduled with Dr. Fowler      To contact a doctor, call Dr. Martha Fowler, [facility] [phone]  or:     [phone] and ask for the Resident On Call for:          Ophthalmology (answered 24 hours a day)   Emergency Departments:  [facility] [place]: [phone]

## 2025-07-30 NOTE — ANESTHESIA PROCEDURE NOTES
Airway       Patient location during procedure: OR       Procedure Start/Stop Times: 7/30/2025 3:41 PM  Staff -        Performed By: CRNA  Consent for Airway        Urgency: elective  Indications and Patient Condition       Indications for airway management: chayo-procedural       Induction type:intravenous       Mask difficulty assessment: 1 - vent by mask    Final Airway Details       Final airway type: supraglottic airway    Supraglottic Airway Details        Type: LMA       LMA size: 4    Post intubation assessment        Placement verified by: capnometry, equal breath sounds and chest rise        Number of attempts at approach: 1       Secured with: tape       Ease of procedure: easy       Dentition: Unchanged    Medication(s) Administered   Medication Administration Time: 7/30/2025 3:41 PM

## 2025-07-30 NOTE — OP NOTE
DOS 7/30/2025    PREOPERATIVE DIAGNOSIS:    Esotropia  2.   Diplopia    POSTOPERATIVE DIAGNOSIS:   same    PROCEDURE PERFORMED:    Forced duction testing    STAFF SURGEON: Martha Chilel MD.    ANESTHESIA: General.     ESTIMATED BLOOD LOSS: 1 mL.     COMPLICATIONS: None.     INDICATION FOR PROCEDURE  ***    DETAILS OF PROCEDURE  ***    MARTHA CHILEL MD     Scleral passes are performed at these marks and the muscle was tied down.  8-0 Vicryl suture was used to repair the conjunctiva.  The lid speculum traction sutures were removed from the right eye and an occluder was placed.  Attention was turned to the left eye where an identical procedure was performed except the left medial rectus muscle was recessed 6.0 mm.  TobraDex ointment was placed in both eyes.  The patient tolerated the procedure well and to the recovery room in stable condition.    SUHAIL CHILEL MD

## 2025-07-30 NOTE — ANESTHESIA CARE TRANSFER NOTE
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    Anesthesia Type:   General     Note:    Oropharynx: oropharynx clear of all foreign objects  Level of Consciousness: drowsy and awake  Oxygen Supplementation: room air    Independent Airway: airway patency satisfactory and stable  Dentition: dentition unchanged  Vital Signs Stable: post-procedure vital signs reviewed and stable  Report to RN Given: handoff report given  Patient transferred to: PACU    Handoff Report: Identifed the Patient, Identified the Reponsible Provider, Reviewed the pertinent medical history, Discussed the surgical course, Reviewed Intra-OP anesthesia mangement and issues during anesthesia, Set expectations for post-procedure period and Allowed opportunity for questions and acknowledgement of understanding      Vitals:  Vitals Value Taken Time   /86 07/30/25 17:00   Temp 37  C (98.6  F) 07/30/25 16:35   Pulse 76 07/30/25 17:05   Resp 13 07/30/25 17:05   SpO2 95 % 07/30/25 17:05   Vitals shown include unfiled device data.    Electronically Signed By: Ariadna Taylor MD  July 30, 2025  5:14 PM

## 2025-07-30 NOTE — ANESTHESIA POSTPROCEDURE EVALUATION
Patient: Javid Meraz    Procedure: Procedure(s):  RECESSION OR RESECTION, MUSCLE, EXTRAOCULAR, FOR STRABISMUS CORRECTION       Anesthesia Type:  General    Note:  Disposition: Outpatient   Postop Pain Control: Uneventful            Sign Out: Well controlled pain   PONV:    Neuro/Psych: Uneventful            Sign Out: Acceptable/Baseline neuro status   Airway/Respiratory: Uneventful            Sign Out: Acceptable/Baseline resp. status   CV/Hemodynamics: Uneventful            Sign Out: Acceptable CV status; No obvious hypovolemia; No obvious fluid overload   Other NRE: NONE   DID A NON-ROUTINE EVENT OCCUR?            Last vitals:  Vitals Value Taken Time   /86 07/30/25 17:00   Temp 37  C (98.6  F) 07/30/25 16:35   Pulse 76 07/30/25 17:05   Resp 13 07/30/25 17:05   SpO2 95 % 07/30/25 17:05   Vitals shown include unfiled device data.    Electronically Signed By: Ariadna Taylor MD  July 30, 2025  5:14 PM

## 2025-08-07 ENCOUNTER — OFFICE VISIT (OUTPATIENT)
Dept: OPHTHALMOLOGY | Facility: CLINIC | Age: 80
End: 2025-08-07
Attending: OPHTHALMOLOGY
Payer: COMMERCIAL

## 2025-08-07 DIAGNOSIS — H53.2 DIPLOPIA: ICD-10-CM

## 2025-08-07 DIAGNOSIS — R29.891 OCULAR TORTICOLLIS: ICD-10-CM

## 2025-08-07 DIAGNOSIS — H50.00 MONOCULAR ESOTROPIA: Primary | ICD-10-CM

## 2025-08-07 PROCEDURE — 99211 OFF/OP EST MAY X REQ PHY/QHP: CPT | Performed by: OPHTHALMOLOGY

## 2025-08-07 PROCEDURE — 92060 SENSORIMOTOR EXAMINATION: CPT | Performed by: OPHTHALMOLOGY

## 2025-08-07 ASSESSMENT — REFRACTION_WEARINGRX
OS_SPHERE: +1.00
OS_AXIS: 010
OD_SPHERE: +1.25
OS_CYLINDER: +2.50
OS_ADD: +2.75
OD_AXIS: 165
OD_ADD: +2.75
OD_CYLINDER: +2.75

## 2025-08-07 ASSESSMENT — CONF VISUAL FIELD
OS_INFERIOR_TEMPORAL_RESTRICTION: 0
OD_SUPERIOR_NASAL_RESTRICTION: 0
OS_SUPERIOR_NASAL_RESTRICTION: 0
OS_NORMAL: 1
OS_INFERIOR_NASAL_RESTRICTION: 0
OD_SUPERIOR_TEMPORAL_RESTRICTION: 0
OD_INFERIOR_TEMPORAL_RESTRICTION: 0
OD_INFERIOR_NASAL_RESTRICTION: 0
OS_SUPERIOR_TEMPORAL_RESTRICTION: 0
OD_NORMAL: 1

## 2025-08-07 ASSESSMENT — VISUAL ACUITY
OD_CC: 20/20
OS_CC+: -3
OS_CC: 20/25
OD_CC+: -2
CORRECTION_TYPE: GLASSES
METHOD: SNELLEN - LINEAR

## 2025-08-07 ASSESSMENT — EXTERNAL EXAM - RIGHT EYE: OD_EXAM: NORMAL

## 2025-08-07 ASSESSMENT — EXTERNAL EXAM - LEFT EYE: OS_EXAM: NORMAL

## 2025-08-07 ASSESSMENT — SLIT LAMP EXAM - LIDS
COMMENTS: MGD, TELANGIECTASIAS
COMMENTS: MGD, TELANGIECTASIAS

## (undated) DEVICE — EYE MARKING PAD 581057

## (undated) DEVICE — COVER CAMERA IN-LIGHT DISP LT-C02

## (undated) DEVICE — EYE PREP BETADINE 5% SOLUTION 30ML 0065-0411-30

## (undated) DEVICE — SOLUTION WATER 1000ML BOTTLE R5000-01

## (undated) DEVICE — SU VICRYL 6-0 S-29 12" J556G

## (undated) DEVICE — SU VICRYL 8-0 TG140-8DA 12" J548G

## (undated) DEVICE — GLOVE PROTEXIS MICRO 6.5 LT BLUE 2D73PM65

## (undated) DEVICE — ESU EYE LOW TEMP 65410-010

## (undated) DEVICE — SU SILK 5-0 TF 18" N266H

## (undated) DEVICE — POSITIONER ARMBOARD FOAM CONVOLUTE CP-501

## (undated) DEVICE — PACK MINOR EYE

## (undated) DEVICE — LINEN TOWEL PACK X5 5464

## (undated) DEVICE — STRAP POSITIONING 60X31" BODY KNEE KBS 01

## (undated) RX ORDER — FENTANYL CITRATE-0.9 % NACL/PF 10 MCG/ML
PLASTIC BAG, INJECTION (ML) INTRAVENOUS
Status: DISPENSED
Start: 2025-07-30

## (undated) RX ORDER — EPHEDRINE SULFATE 50 MG/ML
INJECTION, SOLUTION INTRAMUSCULAR; INTRAVENOUS; SUBCUTANEOUS
Status: DISPENSED
Start: 2025-07-30

## (undated) RX ORDER — ACETAMINOPHEN 325 MG/1
TABLET ORAL
Status: DISPENSED
Start: 2025-07-30

## (undated) RX ORDER — FENTANYL CITRATE 50 UG/ML
INJECTION, SOLUTION INTRAMUSCULAR; INTRAVENOUS
Status: DISPENSED
Start: 2025-07-30

## (undated) RX ORDER — DEXAMETHASONE SODIUM PHOSPHATE 4 MG/ML
INJECTION, SOLUTION INTRA-ARTICULAR; INTRALESIONAL; INTRAMUSCULAR; INTRAVENOUS; SOFT TISSUE
Status: DISPENSED
Start: 2025-07-30

## (undated) RX ORDER — ONDANSETRON 2 MG/ML
INJECTION INTRAMUSCULAR; INTRAVENOUS
Status: DISPENSED
Start: 2025-07-30